# Patient Record
Sex: FEMALE | Race: WHITE | Employment: STUDENT | URBAN - METROPOLITAN AREA
[De-identification: names, ages, dates, MRNs, and addresses within clinical notes are randomized per-mention and may not be internally consistent; named-entity substitution may affect disease eponyms.]

---

## 2020-03-09 ENCOUNTER — HOSPITAL ENCOUNTER (EMERGENCY)
Age: 20
Discharge: HOME | End: 2020-03-09
Payer: COMMERCIAL

## 2020-03-09 VITALS
OXYGEN SATURATION: 99 % | SYSTOLIC BLOOD PRESSURE: 128 MMHG | BODY MASS INDEX: 27.6 KG/M2 | DIASTOLIC BLOOD PRESSURE: 79 MMHG | WEIGHT: 150 LBS | HEIGHT: 62 IN | RESPIRATION RATE: 16 BRPM | TEMPERATURE: 98.5 F | HEART RATE: 77 BPM

## 2020-03-09 PROCEDURE — 99283 EMERGENCY DEPT VISIT LOW MDM: CPT

## 2020-03-09 RX ORDER — ONDANSETRON 4 MG/1
4 TABLET, ORALLY DISINTEGRATING ORAL EVERY 8 HOURS PRN
Qty: 20 TABLET | Refills: 0 | Status: SHIPPED | OUTPATIENT
Start: 2020-03-09 | End: 2021-11-10 | Stop reason: ALTCHOICE

## 2020-03-09 RX ORDER — ONDANSETRON 4 MG/1
4 TABLET, ORALLY DISINTEGRATING ORAL EVERY 8 HOURS PRN
Qty: 20 TABLET | Refills: 0 | Status: SHIPPED | OUTPATIENT
Start: 2020-03-09 | End: 2020-03-09 | Stop reason: SDUPTHER

## 2020-03-09 ASSESSMENT — PAIN SCALES - GENERAL
PAINLEVEL_OUTOF10: 4
PAINLEVEL_OUTOF10: 6

## 2020-03-09 ASSESSMENT — PAIN - FUNCTIONAL ASSESSMENT: PAIN_FUNCTIONAL_ASSESSMENT: 0-10

## 2020-03-09 ASSESSMENT — PAIN DESCRIPTION - LOCATION: LOCATION: HEAD

## 2020-03-09 ASSESSMENT — PAIN DESCRIPTION - DESCRIPTORS: DESCRIPTORS: HEADACHE

## 2020-03-09 ASSESSMENT — PAIN DESCRIPTION - PAIN TYPE: TYPE: ACUTE PAIN

## 2020-03-09 NOTE — ED PROVIDER NOTES
 Cetirizine HCl (ALL DAY ALLERGY) 10 MG CAPS Take  by mouth.  montelukast (SINGULAIR) 5 MG chewable tablet Take 5 mg by mouth nightly.  Melatonin 5 MG TABS tablet Take  by mouth nightly. ALLERGIES    Allergies   Allergen Reactions    Amoxicillin Hives       FAMILY HISTORY    Family History   Problem Relation Age of Onset    Cancer Maternal Uncle     Osteoporosis Maternal Grandfather        SOCIAL HISTORY    Social History     Socioeconomic History    Marital status: Single     Spouse name: None    Number of children: None    Years of education: None    Highest education level: None   Occupational History    None   Social Needs    Financial resource strain: None    Food insecurity     Worry: None     Inability: None    Transportation needs     Medical: None     Non-medical: None   Tobacco Use    Smoking status: Never Smoker    Smokeless tobacco: Never Used   Substance and Sexual Activity    Alcohol use: No    Drug use: No    Sexual activity: Never   Lifestyle    Physical activity     Days per week: None     Minutes per session: None    Stress: None   Relationships    Social connections     Talks on phone: None     Gets together: None     Attends Yarsanism service: None     Active member of club or organization: None     Attends meetings of clubs or organizations: None     Relationship status: None    Intimate partner violence     Fear of current or ex partner: None     Emotionally abused: None     Physically abused: None     Forced sexual activity: None   Other Topics Concern    None   Social History Narrative    None       REVIEW OF SYSTEMS    10 systems reviewed, pertinent positives per HPI otherwise noted to be negative      PHYSICAL EXAM  Vitals:    03/09/20 1453   BP: 128/79   Pulse: 77   Resp: 16   Temp:    SpO2: 99%       GENERAL: Patient is well-developed, well-nourished,  no acute distress. No apparent discomfort. Non toxic appearing. HEENT:  Normocephalic. Atraumatic. There are no bony depressions, instability, step-off, or crepitus to palpation of the skull. There is no raccoon's eyes or battles sign observed. PERRL. EOMI. Conjunctiva appear normal.  External ears are normal.  Bilateral TM are without erythema and are not bulging. There is no evidence of rupture or hemotympanum. There is no facial asymmetry. Tongue is midline, uvula is midline. Posterior oropharynx is without edema, erythema, or exudate. NECK: Supple with normal ROM. Trachea midline. There is no tenderness to palpation of the cervical midline spine. LUNGS:  Normal work of breathing. Speaking comfortably in full sentences. Breath sounds clear throughout all lung fields. CADIOVASCULAR:  Regular rate and rhythm. S1/S2 normal, no murmurs, rubs, or gallops on exam. Peripheral pulses are symmetric and strong. GI: Nondistended. Soft, non-tender to palpation, bowel sounds active in all 4 quadrants, no hepatosplenomegaly. EXTREMITIES: Normal ROM, no edema, no tenderness, or deformity. Distal pulses palpable. No gross deformities or trauma noted. Moving all extremities equally and appropriately. SKIN: Warm/dry. Skin is intact. No rashes/lesions noted. PSYCHIATRIC: Patient is alert and oriented with normal affect  NEUROLOGIC: GCS is 15. Cranial nerves II-XII grossly intact. Moves all extremities with equal strength. No gross sensory deficits. Answers questions/follows commands appropriately. Procedure  None    LABS  No results found for this visit on 03/09/20. RADIOLOGY    No results found.     [place] Criteria for Head CT Imaging  Imaging is indicated if any of the following are present:  GCS < 15 two hours after injury: No  Suspected open/depressed skull fracture: No  Signs of basilar skull fracture: No  Two or more episodes of vomiting since injury: No  Age 72 years or older: No  Amnesia of time before impact: No  Dangerous mechanism (pedestrian struck by motor vehicle, ejected from MVC, fall >3ft or >5 steps): No  Neurologic deficits on exam: No  Seizures after injury: No  Bleeding diathesis or anticoagulant use: No    Based on the [place] HCT rules, head CT imaging is not indicated at this time. ED COURSE/MDM  Patient seen and evaluated. Old records reviewed. I have evaluated this patient. My supervising physician was available for consultation. Sim Serrato presented to the ED today with above noted complaints. The patient is neurologically intact: GCS is 15, CN II-XII intact, no focal or lateralizing neurologic deficits on exam. The patient is without signs of basilar skull fracture. Based on [facility] [address] criteria the patient did not need CT imaging at this time. Patient's fall was 5 days ago and I feel her symptoms are most consistent with concussion. I advised the patient and her parents were present at the bedside that imaging is not recommended. I discussed concussion and precautions about returning to horse riding prior to her symptoms resolving. She was given return to play guidelines. We discussed use of over-the-counter medication to include ibuprofen to help with headache. I also discussed with her rest and decrease stress and stimulation. Patient was discharged with Zofran to see if this will help with the nausea. She was advised to follow-up with primary care provider in regards to today's ED visit. At this point I do not feel the patient requires further work up and it is reasonable to discharge the patient. A discussion was had with the patient regarding diagnosis, treatment/ plan of care, and follow up. All questions were answered. Patient will follow up as directed for further evaluation/treatment. The patient was given strict return precautions as we discussed symptoms that would necessitate return to the ED. Patient will return to ED for new/worsening symptoms.  The patient verbalized their understanding and agreement with the above plan.    Please refer to AVS for further details of the discharge instructions. Patient was sent home with a prescription for below medication/s. I did [place] patient on appropriate use of these medication. Discharge Medication List as of 3/9/2020  2:46 PM          I estimate there is LOW risk for SUBARACHNOID HEMORRHAGE, MENINGITIS, INTRACRANIAL HEMORRHAGE, SUBDURAL HEMATOMA, OR STROKE, thus I consider the discharge disposition reasonable. Ligia Quintanilla and I have discussed the diagnosis and risks, and we agree with discharging home to follow-up with their primary doctor. We also discussed returning to the Emergency Department immediately if new or worsening symptoms occur. We have discussed the symptoms which are most concerning (e.g., changing or worsening pain, weakness, vomiting, fever) that necessitate immediate return. Clinical Impression  1. Injury of head, initial encounter    2. Fall from horse, initial encounter        Discharge Vital Signs:  Blood pressure 128/79, pulse 77, temperature 98.5 °F (36.9 °C), temperature source Oral, resp. rate 16, height 5' 2\" (1.575 m), weight 150 lb (68 kg), SpO2 99 %. FOLLOW UP  Traci Ervin DO  [address] [facility] [address]    Call   As needed    [facility]  ED  [address] [facility] [address]  Go to   If symptoms worsen      DISPOSITION  Patient was discharged to home in good condition. Comment: Please note this report has been produced using speech recognition software and may contain errors related to that system including errors in grammar, punctuation, and spelling, as well as words and phrases that may be inappropriate. If there are any questions or concerns please feel free to contact the dictating provider for clarification.         ZAY Olivas - CNP  03/09/20 2469

## 2021-09-08 ENCOUNTER — HOSPITAL ENCOUNTER (OUTPATIENT)
Age: 21
Discharge: HOME | End: 2021-09-08
Payer: COMMERCIAL

## 2021-09-08 LAB
ANION GAP SERPL CALCULATED.3IONS-SCNC: 12 MMOL/L (ref 3–16)
BILIRUBIN URINE: NEGATIVE
BLOOD, URINE: NEGATIVE
BUN BLDV-MCNC: 6 MG/DL (ref 7–20)
CALCIUM SERPL-MCNC: 9.6 MG/DL (ref 8.3–10.6)
CHLORIDE BLD-SCNC: 104 MMOL/L (ref 99–110)
CLARITY: CLEAR
CO2: 23 MMOL/L (ref 21–32)
COLOR: NORMAL
CREAT SERPL-MCNC: 0.6 MG/DL (ref 0.6–1.1)
GFR AFRICAN AMERICAN: >60
GFR NON-AFRICAN AMERICAN: >60
GLUCOSE BLD-MCNC: 88 MG/DL (ref 70–99)
GLUCOSE URINE: NEGATIVE MG/DL
HCT VFR BLD CALC: 40.1 % (ref 36–48)
HEMOGLOBIN: 13.5 G/DL (ref 12–16)
KETONES, URINE: NEGATIVE MG/DL
LEUKOCYTE ESTERASE, URINE: NEGATIVE
MCH RBC QN AUTO: 28.4 PG (ref 26–34)
MCHC RBC AUTO-ENTMCNC: 33.8 G/DL (ref 31–36)
MCV RBC AUTO: 84.2 FL (ref 80–100)
MICROSCOPIC EXAMINATION: NORMAL
NITRITE, URINE: NEGATIVE
PDW BLD-RTO: 14.1 % (ref 12.4–15.4)
PH UA: 6 (ref 5–8)
PLATELET # BLD: 263 K/UL (ref 135–450)
PMV BLD AUTO: 7.3 FL (ref 5–10.5)
POTASSIUM SERPL-SCNC: 4.4 MMOL/L (ref 3.5–5.1)
PROTEIN UA: NEGATIVE MG/DL
RBC # BLD: 4.76 M/UL (ref 4–5.2)
SODIUM BLD-SCNC: 139 MMOL/L (ref 136–145)
SPECIFIC GRAVITY UA: 1.02 (ref 1–1.03)
URINE TYPE: NORMAL
UROBILINOGEN, URINE: 0.2 E.U./DL
WBC # BLD: 8.8 K/UL (ref 4–11)

## 2021-09-08 PROCEDURE — 85027 COMPLETE CBC AUTOMATED: CPT

## 2021-09-08 PROCEDURE — 81003 URINALYSIS AUTO W/O SCOPE: CPT

## 2021-09-08 PROCEDURE — 36415 COLL VENOUS BLD VENIPUNCTURE: CPT

## 2021-09-08 PROCEDURE — 80048 BASIC METABOLIC PNL TOTAL CA: CPT

## 2021-11-10 ENCOUNTER — OFFICE VISIT (OUTPATIENT)
Dept: ENT CLINIC | Age: 21
End: 2021-11-10
Payer: COMMERCIAL

## 2021-11-10 VITALS
HEART RATE: 90 BPM | SYSTOLIC BLOOD PRESSURE: 121 MMHG | DIASTOLIC BLOOD PRESSURE: 71 MMHG | BODY MASS INDEX: 33.49 KG/M2 | HEIGHT: 62 IN | TEMPERATURE: 97.9 F | WEIGHT: 182 LBS

## 2021-11-10 DIAGNOSIS — J03.91 RECURRENT ACUTE TONSILLITIS: Primary | ICD-10-CM

## 2021-11-10 DIAGNOSIS — G47.30 MILD SLEEP APNEA: ICD-10-CM

## 2021-11-10 DIAGNOSIS — R53.83 OTHER FATIGUE: ICD-10-CM

## 2021-11-10 PROCEDURE — 99204 OFFICE O/P NEW MOD 45 MIN: CPT | Performed by: OTOLARYNGOLOGY

## 2021-11-10 RX ORDER — ACETAMINOPHEN, ASPIRIN AND CAFFEINE 250; 250; 65 MG/1; MG/1; MG/1
1 TABLET, FILM COATED ORAL PRN
COMMUNITY

## 2021-11-10 RX ORDER — SERTRALINE HYDROCHLORIDE 100 MG/1
100 TABLET, FILM COATED ORAL 2 TIMES DAILY
COMMUNITY

## 2021-11-10 RX ORDER — CYCLOBENZAPRINE HCL 10 MG
10 TABLET ORAL PRN
COMMUNITY

## 2021-11-10 RX ORDER — LEVONORGESTREL AND ETHINYL ESTRADIOL 0.1-0.02MG
1 KIT ORAL DAILY
COMMUNITY

## 2021-11-10 ASSESSMENT — ENCOUNTER SYMPTOMS
SINUS PRESSURE: 0
APNEA: 0
SHORTNESS OF BREATH: 0
TROUBLE SWALLOWING: 0
COUGH: 0
FACIAL SWELLING: 0
EYE ITCHING: 0
VOICE CHANGE: 0
SORE THROAT: 0

## 2021-11-10 NOTE — LETTER
[facility]    Surgery Schedule Request Form: 11/10/21  [facility] , Bharathi Wynn      DATE OF SURGERY: ***    TIME OF SURGERY:  ***            CONF #: ____________________       Patient Information:    Patient name: Karl Diaz    YOB: 2000 Age/Sex:21 y.o./female    SS #:[id number]    Wt Readings from Last 1 Encounters:   11/10/21 182 lb (82.6 kg)       Telephone Information:   Mobile [phone]     Home [phone]     Surgeon & Procedure Information:     Lead surgeon: Nevaeh Coleman Co-Surgeon: no  Phone: [phone] Fax: [phone]  PCP: Lizzy Reyez DO    Diagnosis: recurrent acute tonsillitis    Procedure name/CPT: T&A over than 12 [id number]    Procedure length: 1 hour Anesthesia: General    Special Equipment: no    Patient Status: {patient status ent:[id number]}    Primary Payor Plan: ***  Member ID: ***   Subscriber name: ***    [] Implement attached clinical orders for patient.       Electronically signed by Carin Kim DO on 11/10/2021 at 2:47 PM

## 2021-11-10 NOTE — PROGRESS NOTES
[facility], [address], [address], [address] [place] [address]  P: [phone]       Patient     Daphnie Ramirez  2000    ChiefComplaint     Chief Complaint   Patient presents with    Other     Complains of issues sleeping. This has been going on since she was 15. History of Present Illness     Rob Moon is a 59-year-old female here today for evaluation of mild obstructive sleep apnea, daytime fatigue and recurrent acute tonsillitis. She reports for the last several years 3 infections of strep throat per year for at least the last 3 years. Most recent infection was in the spring which she states was so severe she was nearly hospitalized. Also reports daily fatigue that has been ongoing since the age of 13. She has tried multiple interventions including lifestyle modification and other interventions as recommended by PCP. Recently had home sleep study performance demonstrated mild obstructive sleep apnea. She is here today for evaluation. Reports fatigue is severe enough that she has caught herself falling asleep at the wheel and has had to have her mom come pick her up and leave her car as she is so fatigued despite what she believes is a good night sleep.     Past Medical History     Past Medical History:   Diagnosis Date    Asthma     Fractures     Transient synovitis, left ankle and foot        Past Surgical History     Past Surgical History:   Procedure Laterality Date    FOOT SURGERY      KNEE SURGERY Left     KNEE SURGERY Right        Family History     Family History   Problem Relation Age of Onset    Cancer Maternal Uncle     Osteoporosis Maternal Grandfather        Social History     Social History     Tobacco Use    Smoking status: Never Smoker    Smokeless tobacco: Never Used   Substance Use Topics    Alcohol use: No    Drug use: No        Allergies     Allergies   Allergen Reactions    Amoxicillin Hives       Medications     Current Outpatient Medications   Medication Sig Dispense Refill    sertraline (ZOLOFT) 100 MG tablet Take 150 mg by mouth daily      levonorgestrel-ethinyl estradiol (LARISSIA) 0.1-20 MG-MCG per tablet Take 1 tablet by mouth daily      cyclobenzaprine (FLEXERIL) 10 MG tablet Take 10 mg by mouth as needed for Muscle spasms Splits the pill in half. Only takes it when needed.  aspirin-acetaminophen-caffeine (EXCEDRIN MIGRAINE) 250-250-65 MG per tablet Take 1 tablet by mouth as needed for Headaches      acetaminophen (TYLENOL) 500 MG tablet Take 1,000 mg by mouth.  cetirizine (ZYRTEC) 10 MG tablet Take 10 mg by mouth.  montelukast (SINGULAIR) 10 MG tablet       Cetirizine HCl (ALL DAY ALLERGY) 10 MG CAPS Take  by mouth. No current facility-administered medications for this visit. Review of Systems     Review of Systems   Constitutional: Positive for fatigue. Negative for appetite change, chills, fever and unexpected weight change. HENT: Negative for congestion, ear discharge, ear pain, facial swelling, hearing loss, nosebleeds, postnasal drip, sinus pressure, sneezing, sore throat, tinnitus, trouble swallowing and voice change. Eyes: Negative for itching. Respiratory: Negative for apnea, cough and shortness of breath. Endocrine: Negative for cold intolerance and heat intolerance. Musculoskeletal: Negative for myalgias and neck pain. Skin: Negative for rash. Allergic/Immunologic: Negative for environmental allergies. Neurological: Negative for dizziness and headaches. Psychiatric/Behavioral: Negative for confusion, decreased concentration and sleep disturbance. PhysicalExam     Vitals:    11/10/21 1432   BP: 121/71   Site: Right Upper Arm   Position: Sitting   Cuff Size: Medium Adult   Pulse: 90   Temp: 97.9 °F (36.6 °C)   Weight: 182 lb (82.6 kg)   Height: 5' 2\" (1.575 m)       Physical Exam  Constitutional:       General: She is not in acute distress.      Appearance: She is well-developed. HENT:      Head: Normocephalic and atraumatic. Right Ear: Tympanic membrane, ear canal and external ear normal. No drainage. No middle ear effusion. Tympanic membrane is not bulging. Tympanic membrane has normal mobility. Left Ear: Tympanic membrane, ear canal and external ear normal. No drainage. No middle ear effusion. Tympanic membrane is not bulging. Tympanic membrane has normal mobility. Nose: No mucosal edema or rhinorrhea. Mouth/Throat:      Lips: Pink. Mouth: Mucous membranes are moist.      Tongue: No lesions. Palate: No mass. Pharynx: Uvula midline. Tonsils: 3+ on the right. 3+ on the left. Comments: Mallampati 3  Obtuse cervicomental angle   Mirror exam was performed. The nasopharynx, larynx,or hypopharynx were examined. Vocal fold motion was examined. Pertinent findings include: normal  Eyes:      Pupils: Pupils are equal, round, and reactive to light. Neck:      Thyroid: No thyroid mass or thyromegaly. Trachea: Trachea and phonation normal.   Cardiovascular:      Pulses: Normal pulses. Pulmonary:      Effort: Pulmonary effort is normal. No accessory muscle usage or respiratory distress. Breath sounds: No stridor. Musculoskeletal:      Cervical back: Full passive range of motion without pain. Lymphadenopathy:      Head:      Right side of head: No submental or submandibular adenopathy. Left side of head: No submental or submandibular adenopathy. Cervical: No cervical adenopathy. Right cervical: No superficial, deep or posterior cervical adenopathy. Left cervical: No superficial, deep or posterior cervical adenopathy. Skin:     General: Skin is warm and dry. Neurological:      Mental Status: She is alert and oriented to person, place, and time. Cranial Nerves: No cranial nerve deficit.       Coordination: Coordination normal.      Gait: Gait normal.   Psychiatric:         Thought Content: Thought content normal.           Assessment and Plan     1. Recurrent acute tonsillitis  Given the above history and the patient's desire for surgery, they are is a candidate for tonsillectomy with possible adenoidectomy.   -Risks and benefits of the above procedure include pain, dysphagia, inflammation, infection (removal of the tonsils and/or adenoids does not prevent pharyngitis), hemorrhage requiring operative management, dysgeusia/decreased sensation to the ipsilateral tongue. Adenoidectomy carries additional specific risks of velopharyngeal insufficiency with possible hypernasal speech or nasal regurgitation of food/liquids. -General anesthesia carries its own risks, which will be discussed with the Anesthesiology team on day of surgery  -After discussion of the risks and benefits, the patient was in agreement with the above plan for tonsillectomy/adenoidectomy.    -The patient will need to see their primary care physician for medical clearance    2. Mild sleep apnea  -Discussed patient's anatomic findings including large tonsils, obtuse cervical mental angle and Mallampati 3. Discussed options of oral appliance versus tonsillectomy versus CPAP  -Discussed that her severity of fatigue seems out of proportion to her diagnosis of mild sleep apnea  -She is interested in evaluation by sleep physician for other possible sleep disorders that may be causing her symptoms  - Brittani - Desiree Nagel MD, Sleep Medicine, [facility]    3. Other fatigue  - Yessica Pritchard MD, Sleep Medicine, [facility]      Return 3 weeks after tonsillectomy    Mary Christianson DO  11/10/21      Portions of this note were dictated using Dragon.  There may be linguistic errors secondary to the use of this program.

## 2021-11-10 NOTE — LETTER
[facility]  [address]  Phone: [phone]  Fax: [phone]        November 10, 2021     Patient: Kimberley Gray   YOB: 2000   Date of Visit: 11/10/2021       To Whom it May Concern:    Gibran Roy was seen in my clinic on 11/10/2021. If you have any questions or concerns, please don't hesitate to call.     Sincerely,         Douglas Fields, DO

## 2021-11-29 ENCOUNTER — TELEPHONE (OUTPATIENT)
Dept: ENT CLINIC | Age: 21
End: 2021-11-29

## 2021-12-23 NOTE — PROGRESS NOTES
Anyina Haymaker    Age 24 y.o.    female    2000    MRN [id number]    1/3/2022  Arrival Time_____________  OR Time____________36 [address]ta Galas     Procedure(s):  TONSILLECTOMY AND ADENOIDECTOMY                      General    Surgeon(s):  Luz Beagle, DO       Phone [phone] ([place])     [address]  Cell         Work  _____________________________________________________________________  _____________________________________________________________________  _____________________________________________________________________  _____________________________________________________________________  _____________________________________________________________________    PCP _____________________________ Phone_________________     H&P__________________Bringing      Chart            Epic   DOS      Called________  EKG__________________Bringing      Chart            Epic   DOS      Called________  LAB__________________ Bringing      Chart            Epic   DOS      Called________  Cardiac Clearance_______Bringing      Chart            Epic      DOS      Called________    Cardiologist________________________ Phone___________________________    ? Congregation concerns / Waiver on Chart            PAT Communications________________  ? Pre-op Instructions Given [place]          _________________________________  ? Directions to Surgery Center                          _________________________________  ? Transportation Home_______________      __________________________________  ?  Crutches/Walker__________________        __________________________________    ________Pre-op Orders   _______Transcribed    _______Wt.  ________Pharmacy          _______SCD  ______VTE     ______TED Anil Gins  _______  Surgery Consent    _______  Anesthesia Consent         COVID DATE______________LOCATION________________ RESULT__________

## 2021-12-27 NOTE — PROGRESS NOTES
Obstructive Sleep Apnea (MEGAN) Screening     Patient:  Yonis Plata    YOB: 2000      Medical Record #:  [id number]                     Date:  12/27/2021     1. Are you a loud and/or regular snorer? [x]  Yes       [] No    2. Have you been observed to gasp or stop breathing during sleep? []  Yes       [x] No    3. Do you feel tired or groggy upon awakening or do you awaken with a headache? [x]  Yes       [] No    4. Are you often tired or fatigued during the wake time hours? [x]  Yes       [] No    5. Do you fall asleep sitting, reading, watching TV or driving? [x]  Yes       [] No    6. Do you often have problems with memory or concentration? [x]  Yes       [] No    **If patient's score is ? 3 they are considered high risk for MEGAN. An Anesthesia provider will evaluate the patient and develop a plan of care the day of surgery. Note:  If the patient's BMI is more than 35 kg m¯² , has neck circumference > 40 cm, and/or high blood pressure the risk is greater (© American Sleep Apnea Association, 2006).

## 2021-12-28 ENCOUNTER — TELEPHONE (OUTPATIENT)
Dept: ENT CLINIC | Age: 21
End: 2021-12-28

## 2021-12-30 ENCOUNTER — HOSPITAL ENCOUNTER (OUTPATIENT)
Age: 21
Discharge: HOME | End: 2021-12-30
Payer: COMMERCIAL

## 2021-12-30 PROCEDURE — U0005 INFEC AGEN DETEC AMPLI PROBE: HCPCS

## 2021-12-30 PROCEDURE — U0003 INFECTIOUS AGENT DETECTION BY NUCLEIC ACID (DNA OR RNA); SEVERE ACUTE RESPIRATORY SYNDROME CORONAVIRUS 2 (SARS-COV-2) (CORONAVIRUS DISEASE [COVID-19]), AMPLIFIED PROBE TECHNIQUE, MAKING USE OF HIGH THROUGHPUT TECHNOLOGIES AS DESCRIBED BY CMS-2020-01-R: HCPCS

## 2021-12-30 NOTE — PROGRESS NOTES
Bong Galmyron    Age 24 y.o.    female    2000    MRN [id number]    1/3/2022  Arrival Time_____________  OR Time____________90 Josefine Parmjit     Procedure(s):  TONSILLECTOMY AND ADENOIDECTOMY -MEGAN=5                      General     Surgeon(s):  Oscar Eaves, DO      DAY ADMIT ___  SDS/OP ___  OUTPT IN BED ___         Phone [phone] (home)    PCP _____________________ Phone_________________ Epic ( ) Epic CE ( ) Appt ________    ADDITIONAL INFO __________________________________ Cardio/Consult _____________    NOTES _____________________________________________________________________    ____________________________________________________________________________    PAT APPT DATE:________ TIME: ________  FAXED QAD: _______  (__) H&P w/ hospitalist  ____________________________________________________________________________    COVID TEST: Date/Location______________        NURSING HISTORY COMPLETE: _______  (__) CBC       (__) W/ DIFF ___________  (__)  ECHO    __________  (__) Hgb A1C    ___________  (__) CHEST X RAY   __________  (__) LIPID PROFILE  ___________  (__) EKG   __________  (__) PT/PTT   ___________  (__) PFT's   __________  (__) BMP   ___________  (__) CAROTIDS  __________  (__) CMP   ___________  (__) VEIN MAPPING  __________  (__) U/A   ___________  (__) HISTORY & PHYSICAL __________  (__) URINE C & S  ___________  (__) CARDIAC CLEARANCE __________  (__) U/A W/ FLEX  ___________  (__) PULM.  CLEARANCE __________  (__) SERUM PREGNANCY ___________  (__) Check Epic DOS orders __________  (__) TYPE & SCREEN ________ repeat ( ) (__)  __________________ __________  (__) ALBUMIN   ___________  (__)  __________________ __________  (__) TRANSFERRIN  ___________  (__)  __________________ __________  (__) LIVER PROFILE  ___________  (__)  __________________ __________  (__) CARBOXY HGB  ___________  (__) URINE PREG DOS __________  (__) NICOTINE & MET.  ___________  (__) BLOOD SUGAR DOS __________  (__) PREALBUMIN  ___________    (__) MRSA NASAL SWAB ___________  (__) BLOOD THINNERS __________  (__) ACE/ ARBS: _____________________    (__) BETABLOCKERS ___________________

## 2021-12-31 LAB — SARS-COV-2: NOT DETECTED

## 2022-01-02 ENCOUNTER — ANESTHESIA EVENT (OUTPATIENT)
Dept: OPERATING ROOM | Age: 22
End: 2022-01-02
Payer: COMMERCIAL

## 2022-01-02 ENCOUNTER — PREP FOR PROCEDURE (OUTPATIENT)
Dept: ENT CLINIC | Age: 22
End: 2022-01-02

## 2022-01-02 RX ORDER — SODIUM CHLORIDE 9 MG/ML
25 INJECTION, SOLUTION INTRAVENOUS PRN
Status: CANCELLED | OUTPATIENT
Start: 2022-01-02

## 2022-01-02 RX ORDER — SODIUM CHLORIDE 0.9 % (FLUSH) 0.9 %
10 SYRINGE (ML) INJECTION PRN
Status: CANCELLED | OUTPATIENT
Start: 2022-01-02

## 2022-01-02 RX ORDER — SODIUM CHLORIDE 0.9 % (FLUSH) 0.9 %
10 SYRINGE (ML) INJECTION EVERY 12 HOURS SCHEDULED
Status: CANCELLED | OUTPATIENT
Start: 2022-01-02

## 2022-01-03 ENCOUNTER — HOSPITAL ENCOUNTER (OUTPATIENT)
Age: 22
Setting detail: OUTPATIENT SURGERY
Discharge: HOME | End: 2022-01-03
Attending: OTOLARYNGOLOGY | Admitting: OTOLARYNGOLOGY
Payer: COMMERCIAL

## 2022-01-03 ENCOUNTER — ANESTHESIA (OUTPATIENT)
Dept: OPERATING ROOM | Age: 22
End: 2022-01-03
Payer: COMMERCIAL

## 2022-01-03 VITALS
WEIGHT: 188 LBS | RESPIRATION RATE: 18 BRPM | SYSTOLIC BLOOD PRESSURE: 111 MMHG | HEIGHT: 63 IN | OXYGEN SATURATION: 96 % | HEART RATE: 91 BPM | DIASTOLIC BLOOD PRESSURE: 68 MMHG | BODY MASS INDEX: 33.31 KG/M2 | TEMPERATURE: 96.2 F

## 2022-01-03 VITALS
RESPIRATION RATE: 2 BRPM | OXYGEN SATURATION: 99 % | DIASTOLIC BLOOD PRESSURE: 56 MMHG | SYSTOLIC BLOOD PRESSURE: 111 MMHG

## 2022-01-03 DIAGNOSIS — J03.91 RECURRENT ACUTE TONSILLITIS: Primary | ICD-10-CM

## 2022-01-03 DIAGNOSIS — Z90.89 S/P TONSILLECTOMY: ICD-10-CM

## 2022-01-03 LAB — PREGNANCY, URINE: NEGATIVE

## 2022-01-03 PROCEDURE — 7100000010 HC PHASE II RECOVERY - FIRST 15 MIN: Performed by: OTOLARYNGOLOGY

## 2022-01-03 PROCEDURE — 3700000001 HC ADD 15 MINUTES (ANESTHESIA): Performed by: OTOLARYNGOLOGY

## 2022-01-03 PROCEDURE — 88304 TISSUE EXAM BY PATHOLOGIST: CPT

## 2022-01-03 PROCEDURE — 6360000002 HC RX W HCPCS: Performed by: NURSE ANESTHETIST, CERTIFIED REGISTERED

## 2022-01-03 PROCEDURE — 2500000003 HC RX 250 WO HCPCS: Performed by: NURSE ANESTHETIST, CERTIFIED REGISTERED

## 2022-01-03 PROCEDURE — 6360000002 HC RX W HCPCS: Performed by: ANESTHESIOLOGY

## 2022-01-03 PROCEDURE — 3600000013 HC SURGERY LEVEL 3 ADDTL 15MIN: Performed by: OTOLARYNGOLOGY

## 2022-01-03 PROCEDURE — 3600000003 HC SURGERY LEVEL 3 BASE: Performed by: OTOLARYNGOLOGY

## 2022-01-03 PROCEDURE — 7100000011 HC PHASE II RECOVERY - ADDTL 15 MIN: Performed by: OTOLARYNGOLOGY

## 2022-01-03 PROCEDURE — 2500000003 HC RX 250 WO HCPCS: Performed by: OTOLARYNGOLOGY

## 2022-01-03 PROCEDURE — 2580000003 HC RX 258: Performed by: OTOLARYNGOLOGY

## 2022-01-03 PROCEDURE — 7100000000 HC PACU RECOVERY - FIRST 15 MIN: Performed by: OTOLARYNGOLOGY

## 2022-01-03 PROCEDURE — 7100000001 HC PACU RECOVERY - ADDTL 15 MIN: Performed by: OTOLARYNGOLOGY

## 2022-01-03 PROCEDURE — 6370000000 HC RX 637 (ALT 250 FOR IP): Performed by: ANESTHESIOLOGY

## 2022-01-03 PROCEDURE — 2580000003 HC RX 258: Performed by: ANESTHESIOLOGY

## 2022-01-03 PROCEDURE — 2709999900 HC NON-CHARGEABLE SUPPLY: Performed by: OTOLARYNGOLOGY

## 2022-01-03 PROCEDURE — 42826 REMOVAL OF TONSILS: CPT | Performed by: OTOLARYNGOLOGY

## 2022-01-03 PROCEDURE — 84703 CHORIONIC GONADOTROPIN ASSAY: CPT

## 2022-01-03 PROCEDURE — 3700000000 HC ANESTHESIA ATTENDED CARE: Performed by: OTOLARYNGOLOGY

## 2022-01-03 RX ORDER — SODIUM CHLORIDE 9 MG/ML
25 INJECTION, SOLUTION INTRAVENOUS PRN
Status: DISCONTINUED | OUTPATIENT
Start: 2022-01-03 | End: 2022-01-03 | Stop reason: HOSPADM

## 2022-01-03 RX ORDER — MEPERIDINE HYDROCHLORIDE 50 MG/ML
12.5 INJECTION INTRAMUSCULAR; INTRAVENOUS; SUBCUTANEOUS EVERY 5 MIN PRN
Status: DISCONTINUED | OUTPATIENT
Start: 2022-01-03 | End: 2022-01-03 | Stop reason: HOSPADM

## 2022-01-03 RX ORDER — MAGNESIUM HYDROXIDE 1200 MG/15ML
LIQUID ORAL CONTINUOUS PRN
Status: COMPLETED | OUTPATIENT
Start: 2022-01-03 | End: 2022-01-03

## 2022-01-03 RX ORDER — DEXAMETHASONE SODIUM PHOSPHATE 4 MG/ML
INJECTION, SOLUTION INTRA-ARTICULAR; INTRALESIONAL; INTRAMUSCULAR; INTRAVENOUS; SOFT TISSUE PRN
Status: DISCONTINUED | OUTPATIENT
Start: 2022-01-03 | End: 2022-01-03 | Stop reason: SDUPTHER

## 2022-01-03 RX ORDER — HYDROCODONE BITARTRATE AND ACETAMINOPHEN 5; 325 MG/1; MG/1
1 TABLET ORAL EVERY 4 HOURS PRN
Qty: 38 TABLET | Refills: 0 | Status: SHIPPED | OUTPATIENT
Start: 2022-01-03 | End: 2022-01-10 | Stop reason: SDUPTHER

## 2022-01-03 RX ORDER — ROCURONIUM BROMIDE 10 MG/ML
INJECTION, SOLUTION INTRAVENOUS PRN
Status: DISCONTINUED | OUTPATIENT
Start: 2022-01-03 | End: 2022-01-03 | Stop reason: SDUPTHER

## 2022-01-03 RX ORDER — SODIUM CHLORIDE, SODIUM LACTATE, POTASSIUM CHLORIDE, CALCIUM CHLORIDE 600; 310; 30; 20 MG/100ML; MG/100ML; MG/100ML; MG/100ML
INJECTION, SOLUTION INTRAVENOUS CONTINUOUS
Status: DISCONTINUED | OUTPATIENT
Start: 2022-01-03 | End: 2022-01-03 | Stop reason: HOSPADM

## 2022-01-03 RX ORDER — PROPOFOL 10 MG/ML
INJECTION, EMULSION INTRAVENOUS PRN
Status: DISCONTINUED | OUTPATIENT
Start: 2022-01-03 | End: 2022-01-03 | Stop reason: SDUPTHER

## 2022-01-03 RX ORDER — SODIUM CHLORIDE 0.9 % (FLUSH) 0.9 %
5-40 SYRINGE (ML) INJECTION EVERY 12 HOURS SCHEDULED
Status: DISCONTINUED | OUTPATIENT
Start: 2022-01-03 | End: 2022-01-03 | Stop reason: HOSPADM

## 2022-01-03 RX ORDER — LIDOCAINE HYDROCHLORIDE 10 MG/ML
0.3 INJECTION, SOLUTION EPIDURAL; INFILTRATION; INTRACAUDAL; PERINEURAL
Status: DISCONTINUED | OUTPATIENT
Start: 2022-01-03 | End: 2022-01-03 | Stop reason: HOSPADM

## 2022-01-03 RX ORDER — BUPIVACAINE HYDROCHLORIDE 5 MG/ML
INJECTION, SOLUTION EPIDURAL; INTRACAUDAL PRN
Status: DISCONTINUED | OUTPATIENT
Start: 2022-01-03 | End: 2022-01-03 | Stop reason: ALTCHOICE

## 2022-01-03 RX ORDER — SODIUM CHLORIDE 0.9 % (FLUSH) 0.9 %
5-40 SYRINGE (ML) INJECTION PRN
Status: DISCONTINUED | OUTPATIENT
Start: 2022-01-03 | End: 2022-01-03 | Stop reason: HOSPADM

## 2022-01-03 RX ORDER — OXYCODONE HYDROCHLORIDE AND ACETAMINOPHEN 5; 325 MG/1; MG/1
2 TABLET ORAL PRN
Status: COMPLETED | OUTPATIENT
Start: 2022-01-03 | End: 2022-01-03

## 2022-01-03 RX ORDER — LIDOCAINE HYDROCHLORIDE 20 MG/ML
INJECTION, SOLUTION INFILTRATION; PERINEURAL PRN
Status: DISCONTINUED | OUTPATIENT
Start: 2022-01-03 | End: 2022-01-03 | Stop reason: SDUPTHER

## 2022-01-03 RX ORDER — LABETALOL HYDROCHLORIDE 5 MG/ML
5 INJECTION, SOLUTION INTRAVENOUS EVERY 10 MIN PRN
Status: DISCONTINUED | OUTPATIENT
Start: 2022-01-03 | End: 2022-01-03 | Stop reason: HOSPADM

## 2022-01-03 RX ORDER — ONDANSETRON 2 MG/ML
4 INJECTION INTRAMUSCULAR; INTRAVENOUS EVERY 10 MIN PRN
Status: DISCONTINUED | OUTPATIENT
Start: 2022-01-03 | End: 2022-01-03 | Stop reason: HOSPADM

## 2022-01-03 RX ORDER — SODIUM CHLORIDE 0.9 % (FLUSH) 0.9 %
10 SYRINGE (ML) INJECTION EVERY 12 HOURS SCHEDULED
Status: DISCONTINUED | OUTPATIENT
Start: 2022-01-03 | End: 2022-01-03 | Stop reason: HOSPADM

## 2022-01-03 RX ORDER — HYDRALAZINE HYDROCHLORIDE 20 MG/ML
5 INJECTION INTRAMUSCULAR; INTRAVENOUS EVERY 10 MIN PRN
Status: DISCONTINUED | OUTPATIENT
Start: 2022-01-03 | End: 2022-01-03 | Stop reason: HOSPADM

## 2022-01-03 RX ORDER — OXYCODONE HYDROCHLORIDE AND ACETAMINOPHEN 5; 325 MG/1; MG/1
1 TABLET ORAL PRN
Status: COMPLETED | OUTPATIENT
Start: 2022-01-03 | End: 2022-01-03

## 2022-01-03 RX ORDER — SODIUM CHLORIDE 0.9 % (FLUSH) 0.9 %
10 SYRINGE (ML) INJECTION PRN
Status: DISCONTINUED | OUTPATIENT
Start: 2022-01-03 | End: 2022-01-03 | Stop reason: HOSPADM

## 2022-01-03 RX ORDER — ALBUTEROL SULFATE 90 UG/1
2 AEROSOL, METERED RESPIRATORY (INHALATION) EVERY 6 HOURS PRN
COMMUNITY

## 2022-01-03 RX ORDER — FENTANYL CITRATE 50 UG/ML
INJECTION, SOLUTION INTRAMUSCULAR; INTRAVENOUS PRN
Status: DISCONTINUED | OUTPATIENT
Start: 2022-01-03 | End: 2022-01-03 | Stop reason: SDUPTHER

## 2022-01-03 RX ORDER — ONDANSETRON 2 MG/ML
INJECTION INTRAMUSCULAR; INTRAVENOUS PRN
Status: DISCONTINUED | OUTPATIENT
Start: 2022-01-03 | End: 2022-01-03 | Stop reason: SDUPTHER

## 2022-01-03 RX ORDER — MIDAZOLAM HYDROCHLORIDE 1 MG/ML
INJECTION INTRAMUSCULAR; INTRAVENOUS PRN
Status: DISCONTINUED | OUTPATIENT
Start: 2022-01-03 | End: 2022-01-03 | Stop reason: SDUPTHER

## 2022-01-03 RX ADMIN — LIDOCAINE HYDROCHLORIDE 80 MG: 20 INJECTION, SOLUTION INFILTRATION; PERINEURAL at 07:32

## 2022-01-03 RX ADMIN — MEPERIDINE HYDROCHLORIDE 12.5 MG: 50 INJECTION, SOLUTION INTRAMUSCULAR; INTRAVENOUS; SUBCUTANEOUS at 08:25

## 2022-01-03 RX ADMIN — ROCURONIUM BROMIDE 50 MG: 10 SOLUTION INTRAVENOUS at 07:33

## 2022-01-03 RX ADMIN — PROPOFOL 25 MG: 10 INJECTION, EMULSION INTRAVENOUS at 07:56

## 2022-01-03 RX ADMIN — FENTANYL CITRATE 50 MCG: 50 INJECTION INTRAMUSCULAR; INTRAVENOUS at 07:41

## 2022-01-03 RX ADMIN — FENTANYL CITRATE 50 MCG: 50 INJECTION INTRAMUSCULAR; INTRAVENOUS at 07:47

## 2022-01-03 RX ADMIN — PROPOFOL 25 MG: 10 INJECTION, EMULSION INTRAVENOUS at 08:00

## 2022-01-03 RX ADMIN — SODIUM CHLORIDE, SODIUM LACTATE, POTASSIUM CHLORIDE, AND CALCIUM CHLORIDE: .6; .31; .03; .02 INJECTION, SOLUTION INTRAVENOUS at 07:29

## 2022-01-03 RX ADMIN — PROPOFOL 200 MG: 10 INJECTION, EMULSION INTRAVENOUS at 07:33

## 2022-01-03 RX ADMIN — FENTANYL CITRATE 50 MCG: 50 INJECTION INTRAMUSCULAR; INTRAVENOUS at 07:32

## 2022-01-03 RX ADMIN — OXYCODONE AND ACETAMINOPHEN 2 TABLET: 5; 325 TABLET ORAL at 09:10

## 2022-01-03 RX ADMIN — ONDANSETRON 4 MG: 2 INJECTION INTRAMUSCULAR; INTRAVENOUS at 07:39

## 2022-01-03 RX ADMIN — DEXAMETHASONE SODIUM PHOSPHATE 8 MG: 4 INJECTION, SOLUTION INTRAMUSCULAR; INTRAVENOUS at 07:39

## 2022-01-03 RX ADMIN — SUGAMMADEX 200 MG: 100 INJECTION, SOLUTION INTRAVENOUS at 08:00

## 2022-01-03 RX ADMIN — MIDAZOLAM HYDROCHLORIDE 2 MG: 2 INJECTION, SOLUTION INTRAMUSCULAR; INTRAVENOUS at 07:28

## 2022-01-03 RX ADMIN — HYDROMORPHONE HYDROCHLORIDE 0.25 MG: 1 INJECTION, SOLUTION INTRAMUSCULAR; INTRAVENOUS; SUBCUTANEOUS at 08:34

## 2022-01-03 ASSESSMENT — PULMONARY FUNCTION TESTS
PIF_VALUE: 5
PIF_VALUE: 19
PIF_VALUE: 1
PIF_VALUE: 19
PIF_VALUE: 15
PIF_VALUE: 18
PIF_VALUE: 20
PIF_VALUE: 19
PIF_VALUE: 19
PIF_VALUE: 2
PIF_VALUE: 19
PIF_VALUE: 15
PIF_VALUE: 1
PIF_VALUE: 19
PIF_VALUE: 1
PIF_VALUE: 20
PIF_VALUE: 19
PIF_VALUE: 15
PIF_VALUE: 16
PIF_VALUE: 20
PIF_VALUE: 19
PIF_VALUE: 19
PIF_VALUE: 2
PIF_VALUE: 19
PIF_VALUE: 19
PIF_VALUE: 2
PIF_VALUE: 0
PIF_VALUE: 1
PIF_VALUE: 3
PIF_VALUE: 5
PIF_VALUE: 19
PIF_VALUE: 12
PIF_VALUE: 3

## 2022-01-03 ASSESSMENT — PAIN DESCRIPTION - LOCATION: LOCATION: THROAT

## 2022-01-03 ASSESSMENT — PAIN SCALES - GENERAL
PAINLEVEL_OUTOF10: 2
PAINLEVEL_OUTOF10: 8
PAINLEVEL_OUTOF10: 6
PAINLEVEL_OUTOF10: 4

## 2022-01-03 ASSESSMENT — PAIN DESCRIPTION - PAIN TYPE: TYPE: SURGICAL PAIN

## 2022-01-03 ASSESSMENT — PAIN DESCRIPTION - DESCRIPTORS: DESCRIPTORS: SHARP

## 2022-01-03 ASSESSMENT — PAIN - FUNCTIONAL ASSESSMENT: PAIN_FUNCTIONAL_ASSESSMENT: 0-10

## 2022-01-03 NOTE — BRIEF OP NOTE
Brief Postoperative Note      Patient: Laurence Arellano  YOB: 2000  MRN: [id number]    Date of Procedure: 1/3/2022    Pre-Op Diagnosis: Recurrent acute tonsillitis [J03.91]    Post-Op Diagnosis: Same       Procedure(s):  TONSILLECTOMY AND ADENOIDECTOMY -MEGAN=5    Surgeon(s):  Damian Miller DO    Assistant:  Surgical Assistant: Royal Gao    Anesthesia: General    Estimated Blood Loss (mL): Minimal    Complications: None    Specimens:   ID Type Source Tests Collected by Time Destination   A : tonsils, right tonsil marked with suture Specimen Mouth SURGICAL PATHOLOGY Damian Miller DO 1/3/2022 0745        Implants:  * No implants in log *      Drains: * No LDAs found *    Findings: 3+tonsils    Electronically signed by Damian Miller DO on 1/3/2022 at 7:57 AM

## 2022-01-03 NOTE — OP NOTE
[address] [place] [address]  OPERATIVE REPORT    Patient Name: Taylor Jimenez  YOB: 2000  Medical Record Number:  [id number]  Billing Number:  [id number]  Date of Procedure: 01/03/22  Time: 0730      Pre Operative Diagnoses: 1) recurrent acute tonsillitis   Post Operative Diagnoses: Same as above. Procedure: 1) Tonsillectomy (82115)  Surgeon: Todd Harrell DO  Assistant: none  Anesthesia: General anesthesia. Findings: 1) Chronically infected tonsils with tonsiliths   Indications: The patient is a 24year old female  with a history of recurrent acute tonsillitis. Description: After verification of informed consent, the patient was brought to the operating room and placed in the supine position. General endotracheal anesthesia was induced. The bed was then turned, a shoulder roll placed, and a Kaia-Robert mouth guard inserted and suspended from the [address] [place] [address] Road stand. An suction catheter was placed through the naris and the palate retracted with palpation showing no evidence of submucous cleft. An Allis clamp was then used with Bovie electrocautery on 20 kearney spray to grasp and resect the right tonsil from the superior down to the inferior pole. The left tonsil was grasped at the superior pole and Bovie electrocautery used to resect this from superior to inferior. The stomach was then suctioned, tonsillar fossae re-evaluated and spot cautery employed at 35 kearney spray as indicated, and the patient turned back to the care of Anesthesia to recover. The patient tolerated the procedure well and was transferred to the recovery room in stable condition. I was present through the essential portions of the procedure and involved in all medical decision-making. Specimens: bilateral tonsils   Estimated Blood Loss: 5mL  Complications: none  I attest that I was present for and did the entire procedure myself.   Electronically signed by Mac Corrales DO on 1/3/2022 at 7:58 AM

## 2022-01-03 NOTE — PROGRESS NOTES
Report given to OLVIN Dhaliwal. Pt awake, VSS, No nausea. Pain controlled with medication. Pt not on oxygen. Will continue to monitor.

## 2022-01-03 NOTE — H&P
Juan Snow 03, [address], [address] [place] [address]  P: [phone]        Patient      Caesar Carson  2000     ChiefComplaint           Chief Complaint   Patient presents with    Other       Complains of issues sleeping. This has been going on since she was 15.          History of Present Illness      Juan Costa is a 55-year-old female here today for evaluation of mild obstructive sleep apnea, daytime fatigue and recurrent acute tonsillitis. She reports for the last several years 3 infections of strep throat per year for at least the last 3 years. Most recent infection was in the spring which she states was so severe she was nearly hospitalized.     Also reports daily fatigue that has been ongoing since the age of 13. She has tried multiple interventions including lifestyle modification and other interventions as recommended by PCP. Recently had home sleep study performance demonstrated mild obstructive sleep apnea. She is here today for evaluation.   Reports fatigue is severe enough that she has caught herself falling asleep at the wheel and has had to have her mom come pick her up and leave her car as she is so fatigued despite what she believes is a good night sleep.     Past Medical History      Past Medical History        Past Medical History:   Diagnosis Date    Asthma      Fractures      Transient synovitis, left ankle and foot              Past Surgical History      Past Surgical History         Past Surgical History:   Procedure Laterality Date    FOOT SURGERY        KNEE SURGERY Left      KNEE SURGERY Right              Family History      Family History         Family History   Problem Relation Age of Onset    Cancer Maternal Uncle      Osteoporosis Maternal Grandfather              Social History      Social History           Tobacco Use    Smoking status: Never Smoker    Smokeless tobacco: Never Used   Substance Use Topics    Alcohol use: No    Drug use: No         Allergies           Allergies   Allergen Reactions    Amoxicillin Hives         Medications      Current Facility-Administered Medications          Current Outpatient Medications   Medication Sig Dispense Refill    sertraline (ZOLOFT) 100 MG tablet Take 150 mg by mouth daily        levonorgestrel-ethinyl estradiol (LARISSIA) 0.1-20 MG-MCG per tablet Take 1 tablet by mouth daily        cyclobenzaprine (FLEXERIL) 10 MG tablet Take 10 mg by mouth as needed for Muscle spasms Splits the pill in half. Only takes it when needed.        aspirin-acetaminophen-caffeine (EXCEDRIN MIGRAINE) 250-250-65 MG per tablet Take 1 tablet by mouth as needed for Headaches        acetaminophen (TYLENOL) 500 MG tablet Take 1,000 mg by mouth.        cetirizine (ZYRTEC) 10 MG tablet Take 10 mg by mouth.        montelukast (SINGULAIR) 10 MG tablet          Cetirizine HCl (ALL DAY ALLERGY) 10 MG CAPS Take  by mouth.          No current facility-administered medications for this visit.            Review of Systems      Review of Systems   Constitutional: Positive for fatigue. Negative for appetite change, chills, fever and unexpected weight change. HENT: Negative for congestion, ear discharge, ear pain, facial swelling, hearing loss, nosebleeds, postnasal drip, sinus pressure, sneezing, sore throat, tinnitus, trouble swallowing and voice change. Eyes: Negative for itching. Respiratory: Negative for apnea, cough and shortness of breath. Endocrine: Negative for cold intolerance and heat intolerance. Musculoskeletal: Negative for myalgias and neck pain. Skin: Negative for rash. Allergic/Immunologic: Negative for environmental allergies. Neurological: Negative for dizziness and headaches.    Psychiatric/Behavioral: Negative for confusion, decreased concentration and sleep disturbance.            PhysicalExam      Vitals       Vitals:     11/10/21 1432   BP: 121/71   Site: Right Upper Arm   Position: Sitting   Cuff Size: Medium Adult   Pulse: 90   Temp: 97.9 °F (36.6 °C)   Weight: 182 lb (82.6 kg)   Height: 5' 2\" (1.575 m)            Physical Exam  Constitutional:       General: She is not in acute distress. Appearance: She is well-developed. HENT:      Head: Normocephalic and atraumatic. Right Ear: Tympanic membrane, ear canal and external ear normal. No drainage. No middle ear effusion. Tympanic membrane is not bulging. Tympanic membrane has normal mobility. Left Ear: Tympanic membrane, ear canal and external ear normal. No drainage. No middle ear effusion. Tympanic membrane is not bulging. Tympanic membrane has normal mobility. Nose: No mucosal edema or rhinorrhea. Mouth/Throat:      Lips: Pink. Mouth: Mucous membranes are moist.      Tongue: No lesions. Palate: No mass. Pharynx: Uvula midline. Tonsils: 3+ on the right. 3+ on the left. Comments: Mallampati 3  Obtuse cervicomental angle   Mirror exam was performed. The nasopharynx, larynx,or hypopharynx were examined. Vocal fold motion was examined. Pertinent findings include: normal  Eyes:      Pupils: Pupils are equal, round, and reactive to light. Neck:      Thyroid: No thyroid mass or thyromegaly. Trachea: Trachea and phonation normal.   Cardiovascular:      Pulses: Normal pulses. Pulmonary:      Effort: Pulmonary effort is normal. No accessory muscle usage or respiratory distress. Breath sounds: No stridor. Musculoskeletal:      Cervical back: Full passive range of motion without pain. Lymphadenopathy:      Head:      Right side of head: No submental or submandibular adenopathy. Left side of head: No submental or submandibular adenopathy. Cervical: No cervical adenopathy. Right cervical: No superficial, deep or posterior cervical adenopathy. Left cervical: No superficial, deep or posterior cervical adenopathy. Skin:     General: Skin is warm and dry. Neurological:      Mental Status: She is alert and oriented to person, place, and time. Cranial Nerves: No cranial nerve deficit. Coordination: Coordination normal.      Gait: Gait normal.   Psychiatric:         Thought Content: Thought content normal.               Assessment and Plan      1. Recurrent acute tonsillitis  Given the above history and the patient's desire for surgery, they are is a candidate for tonsillectomy with possible adenoidectomy.   -Risks and benefits of the above procedure include pain, dysphagia, inflammation, infection (removal of the tonsils and/or adenoids does not prevent pharyngitis), hemorrhage requiring operative management, dysgeusia/decreased sensation to the ipsilateral tongue. Adenoidectomy carries additional specific risks of velopharyngeal insufficiency with possible hypernasal speech or nasal regurgitation of food/liquids. -General anesthesia carries its own risks, which will be discussed with the Anesthesiology team on day of surgery  -After discussion of the risks and benefits, the patient was in agreement with the above plan for tonsillectomy/adenoidectomy.    -The patient will need to see their primary care physician for medical clearance     2. Mild sleep apnea  -Discussed patient's anatomic findings including large tonsils, obtuse cervical mental angle and Mallampati 3.   Discussed options of oral appliance versus tonsillectomy versus CPAP  -Discussed that her severity of fatigue seems out of proportion to her diagnosis of mild sleep apnea  -She is interested in evaluation by sleep physician for other possible sleep disorders that may be causing her symptoms  - Mckinley Cabrera MD, Sleep Medicine, [facility]     3. Other fatigue  - Mckinley Cabrera MD, Sleep Medicine, [facility]        Return 3 weeks after tonsillectomy     Christina Allan DO  11/10/21

## 2022-01-03 NOTE — ANESTHESIA PRE PROCEDURE
Department of Anesthesiology  Preprocedure Note       Name:  Omer Martinez   Age:  24 y.o.  :  2000                                          MRN:  [id number]         Date:  2022      Surgeon: Delia Ochoa):  Kaitlynn Flanagan DO    Procedure: Procedure(s):  TONSILLECTOMY AND ADENOIDECTOMY -MEGAN=5    Medications prior to admission:   Prior to Admission medications    Medication Sig Start Date End Date Taking? Authorizing Provider   sertraline (ZOLOFT) 100 MG tablet Take 150 mg by mouth daily    Historical Provider, MD   levonorgestrel-ethinyl estradiol (LARISSIA) 0.1-20 MG-MCG per tablet Take 1 tablet by mouth daily    Historical Provider, MD   cyclobenzaprine (FLEXERIL) 10 MG tablet Take 10 mg by mouth as needed for Muscle spasms Splits the pill in half. Only takes it when needed. Historical Provider, MD   aspirin-acetaminophen-caffeine (Luther Velazquez) 058-885-19 MG per tablet Take 1 tablet by mouth as needed for Headaches    Historical Provider, MD   acetaminophen (TYLENOL) 500 MG tablet Take 1,000 mg by mouth. Historical Provider, MD   cetirizine (ZYRTEC) 10 MG tablet Take 10 mg by mouth daily     Historical Provider, MD   montelukast (SINGULAIR) 10 MG tablet Take 10 mg by mouth nightly  10/5/14   Historical Provider, MD       Current medications:    No current facility-administered medications for this encounter. Current Outpatient Medications   Medication Sig Dispense Refill    sertraline (ZOLOFT) 100 MG tablet Take 150 mg by mouth daily      levonorgestrel-ethinyl estradiol (LARISSIA) 0.1-20 MG-MCG per tablet Take 1 tablet by mouth daily      cyclobenzaprine (FLEXERIL) 10 MG tablet Take 10 mg by mouth as needed for Muscle spasms Splits the pill in half. Only takes it when needed.  aspirin-acetaminophen-caffeine (EXCEDRIN MIGRAINE) 250-250-65 MG per tablet Take 1 tablet by mouth as needed for Headaches      acetaminophen (TYLENOL) 500 MG tablet Take 1,000 mg by mouth.       cetirizine (ZYRTEC) 10 MG tablet Take 10 mg by mouth daily       montelukast (SINGULAIR) 10 MG tablet Take 10 mg by mouth nightly          Allergies: Allergies   Allergen Reactions    Amoxicillin Hives       Problem List:    Patient Active Problem List   Diagnosis Code    Pain in joint, lower leg M25.569    Effusion of lower leg joint M25.469    Fracture of tibial spine S82.113A    Pes planus, flexible M21.40    Transient synovitis, left ankle and foot M67.372       Past Medical History:        Diagnosis Date    Asthma     Transient synovitis, left ankle and foot        Past Surgical History:        Procedure Laterality Date    FOOT SURGERY Bilateral     tendon stretched    KNEE SURGERY Left     shattered knee-total of 2 surgeries (ACL and meniscus repair)    KNEE SURGERY Right     removed damaged cartilage    WISDOM TOOTH EXTRACTION         Social History:    Social History     Tobacco Use    Smoking status: Never Smoker    Smokeless tobacco: Never Used   Substance Use Topics    Alcohol use: Yes     Comment: 4-5 drinks per month                                Counseling given: Not Answered      Vital Signs (Current):   Vitals:    12/27/21 1420 12/30/21 1425   Weight: 170 lb (77.1 kg) 188 lb (85.3 kg)   Height: 5' 2\" (1.575 m)                                               BP Readings from Last 3 Encounters:   11/10/21 121/71   03/09/20 128/79   10/14/14 105/73 (45 %, Z = -0.13 /  83 %, Z = 0.95)*     *BP percentiles are based on the 2017 AAP Clinical Practice Guideline for girls       NPO Status:                                                                                 BMI:   Wt Readings from Last 3 Encounters:   11/10/21 182 lb (82.6 kg)   03/09/20 150 lb (68 kg) (80 %, Z= 0.85)*   10/14/14 130 lb (59 kg) (78 %, Z= 0.79)*     * Growth percentiles are based on CDC (Girls, 2-20 Years) data. Body mass index is 34.39 kg/m².     CBC:   Lab Results   Component Value Date    WBC 8.8 09/08/2021 RBC 4.76 09/08/2021    HGB 13.5 09/08/2021    HCT 40.1 09/08/2021    MCV 84.2 09/08/2021    RDW 14.1 09/08/2021     09/08/2021       CMP:   Lab Results   Component Value Date     09/08/2021    K 4.4 09/08/2021     09/08/2021    CO2 23 09/08/2021    BUN 6 09/08/2021    CREATININE 0.6 09/08/2021    GFRAA >60 09/08/2021    LABGLOM >60 09/08/2021    GLUCOSE 88 09/08/2021    CALCIUM 9.6 09/08/2021       POC Tests: No results for input(s): POCGLU, POCNA, POCK, POCCL, POCBUN, POCHEMO, POCHCT in the last 72 hours. Coags: No results found for: PROTIME, INR, APTT    HCG (If Applicable): No results found for: PREGTESTUR, PREGSERUM, HCG, HCGQUANT     ABGs: No results found for: PHART, PO2ART, CCE1VTX, DWP9ICE, BEART, N3BPARLR     Type & Screen (If Applicable):  No results found for: LABABO, LABRH    Drug/Infectious Status (If Applicable):  No results found for: HIV, HEPCAB    COVID-19 Screening (If Applicable):   Lab Results   Component Value Date    COVID19 Not Detected 12/30/2021           Anesthesia Evaluation  Patient summary reviewed and Nursing notes reviewed no history of anesthetic complications:   Airway: Mallampati: II  TM distance: >3 FB   Neck ROM: full  Mouth opening: > = 3 FB Dental: normal exam         Pulmonary:   (+) asthma:                            Cardiovascular:Negative CV ROS                      Neuro/Psych:   Negative Neuro/Psych ROS              GI/Hepatic/Renal: Neg GI/Hepatic/Renal ROS       (-) GERD, liver disease and no renal disease       Endo/Other: Negative Endo/Other ROS       (-) diabetes mellitus               Abdominal:             Vascular: negative vascular ROS. Other Findings:           Anesthesia Plan      general     ASA 2     (I discussed with the patient the risks and benefits of PIV, general anesthesia, IV Narcotics, PACU. All questions were answered the patient agrees with the plan)  Induction: intravenous.     MIPS: Prophylactic antiemetics administered. Anesthetic plan and risks discussed with patient. Plan discussed with CRNA.                   Mary Mohamud MD   1/2/2022

## 2022-01-03 NOTE — PROGRESS NOTES
Patient arrived in PACU at this time and placed on monitor. Report received from [address] and CRNA. Oral airway in place. VSS. Will continue to monitor.

## 2022-01-10 ENCOUNTER — TELEPHONE (OUTPATIENT)
Dept: ENT CLINIC | Age: 22
End: 2022-01-10

## 2022-01-10 DIAGNOSIS — Z90.89 S/P TONSILLECTOMY: ICD-10-CM

## 2022-01-10 DIAGNOSIS — J03.91 RECURRENT ACUTE TONSILLITIS: ICD-10-CM

## 2022-01-10 RX ORDER — HYDROCODONE BITARTRATE AND ACETAMINOPHEN 5; 325 MG/1; MG/1
1 TABLET ORAL EVERY 4 HOURS PRN
Qty: 30 TABLET | Refills: 0 | Status: SHIPPED | OUTPATIENT
Start: 2022-01-10 | End: 2022-01-17

## 2022-01-19 ENCOUNTER — TELEPHONE (OUTPATIENT)
Dept: PULMONOLOGY | Age: 22
End: 2022-01-19

## 2022-01-19 NOTE — TELEPHONE ENCOUNTER
Office cancelled appointment on 2/10/22 with Dr. Teri Montemayor for NPT Sleep. We LM on 1/19/22 and informed pt we would need to cancel appt and requested pt to call office back to reschedule appt.

## 2022-01-25 ENCOUNTER — OFFICE VISIT (OUTPATIENT)
Dept: ENT CLINIC | Age: 22
End: 2022-01-25

## 2022-01-25 VITALS — BODY MASS INDEX: 33.31 KG/M2 | WEIGHT: 188 LBS | TEMPERATURE: 97.7 F | HEIGHT: 63 IN

## 2022-01-25 DIAGNOSIS — Z90.89 S/P TONSILLECTOMY: Primary | ICD-10-CM

## 2022-01-25 PROCEDURE — 99024 POSTOP FOLLOW-UP VISIT: CPT | Performed by: OTOLARYNGOLOGY

## 2022-01-25 RX ORDER — HYDROXYZINE HYDROCHLORIDE 25 MG/1
25 TABLET, FILM COATED ORAL 2 TIMES DAILY
COMMUNITY

## 2022-01-25 NOTE — PROGRESS NOTES
Micheline Holland is a 24year old female 3 weeks status post tonsillectomy. Doing well, return to work 1/17/2022. Has pain with yawning.       Appropriate postoperative changes to bilateral tonsillar fossa      [place] paperwork filled out and faxed  Return as needed

## 2022-04-22 ENCOUNTER — HOSPITAL ENCOUNTER (EMERGENCY)
Age: 22
Discharge: HOME | End: 2022-04-22
Attending: STUDENT IN AN ORGANIZED HEALTH CARE EDUCATION/TRAINING PROGRAM
Payer: COMMERCIAL

## 2022-04-22 VITALS
SYSTOLIC BLOOD PRESSURE: 120 MMHG | OXYGEN SATURATION: 99 % | RESPIRATION RATE: 18 BRPM | HEART RATE: 87 BPM | DIASTOLIC BLOOD PRESSURE: 70 MMHG | TEMPERATURE: 98.7 F | HEIGHT: 62 IN | BODY MASS INDEX: 36.8 KG/M2 | WEIGHT: 200 LBS

## 2022-04-22 DIAGNOSIS — G43.109 MIGRAINE WITH AURA AND WITHOUT STATUS MIGRAINOSUS, NOT INTRACTABLE: Primary | ICD-10-CM

## 2022-04-22 PROCEDURE — 2580000003 HC RX 258: Performed by: STUDENT IN AN ORGANIZED HEALTH CARE EDUCATION/TRAINING PROGRAM

## 2022-04-22 PROCEDURE — 6360000002 HC RX W HCPCS: Performed by: STUDENT IN AN ORGANIZED HEALTH CARE EDUCATION/TRAINING PROGRAM

## 2022-04-22 PROCEDURE — 99284 EMERGENCY DEPT VISIT MOD MDM: CPT

## 2022-04-22 PROCEDURE — 6370000000 HC RX 637 (ALT 250 FOR IP): Performed by: STUDENT IN AN ORGANIZED HEALTH CARE EDUCATION/TRAINING PROGRAM

## 2022-04-22 PROCEDURE — 96374 THER/PROPH/DIAG INJ IV PUSH: CPT

## 2022-04-22 PROCEDURE — 96375 TX/PRO/DX INJ NEW DRUG ADDON: CPT

## 2022-04-22 RX ORDER — ACETAMINOPHEN 325 MG/1
650 TABLET ORAL ONCE
Status: COMPLETED | OUTPATIENT
Start: 2022-04-22 | End: 2022-04-22

## 2022-04-22 RX ORDER — DIPHENHYDRAMINE HYDROCHLORIDE 50 MG/ML
25 INJECTION INTRAMUSCULAR; INTRAVENOUS ONCE
Status: COMPLETED | OUTPATIENT
Start: 2022-04-22 | End: 2022-04-22

## 2022-04-22 RX ORDER — PROCHLORPERAZINE EDISYLATE 5 MG/ML
10 INJECTION INTRAMUSCULAR; INTRAVENOUS ONCE
Status: COMPLETED | OUTPATIENT
Start: 2022-04-22 | End: 2022-04-22

## 2022-04-22 RX ORDER — 0.9 % SODIUM CHLORIDE 0.9 %
1000 INTRAVENOUS SOLUTION INTRAVENOUS ONCE
Status: COMPLETED | OUTPATIENT
Start: 2022-04-22 | End: 2022-04-22

## 2022-04-22 RX ORDER — DEXAMETHASONE SODIUM PHOSPHATE 10 MG/ML
10 INJECTION, SOLUTION INTRAMUSCULAR; INTRAVENOUS ONCE
Status: COMPLETED | OUTPATIENT
Start: 2022-04-22 | End: 2022-04-22

## 2022-04-22 RX ADMIN — SODIUM CHLORIDE 1000 ML: 9 INJECTION, SOLUTION INTRAVENOUS at 09:43

## 2022-04-22 RX ADMIN — PROCHLORPERAZINE EDISYLATE 10 MG: 5 INJECTION INTRAMUSCULAR; INTRAVENOUS at 09:42

## 2022-04-22 RX ADMIN — ACETAMINOPHEN 650 MG: 325 TABLET ORAL at 09:42

## 2022-04-22 RX ADMIN — DEXAMETHASONE SODIUM PHOSPHATE 10 MG: 10 INJECTION, SOLUTION INTRAMUSCULAR; INTRAVENOUS at 09:41

## 2022-04-22 RX ADMIN — DIPHENHYDRAMINE HYDROCHLORIDE 25 MG: 50 INJECTION, SOLUTION INTRAMUSCULAR; INTRAVENOUS at 09:40

## 2022-04-22 ASSESSMENT — ENCOUNTER SYMPTOMS
NAUSEA: 1
PHOTOPHOBIA: 1
STRIDOR: 0
SORE THROAT: 0
COUGH: 0
ABDOMINAL PAIN: 0
RHINORRHEA: 0
BACK PAIN: 0
SHORTNESS OF BREATH: 0
VOMITING: 0

## 2022-04-22 ASSESSMENT — PAIN - FUNCTIONAL ASSESSMENT: PAIN_FUNCTIONAL_ASSESSMENT: 0-10

## 2022-04-22 ASSESSMENT — PAIN DESCRIPTION - ORIENTATION: ORIENTATION: RIGHT

## 2022-04-22 ASSESSMENT — PAIN SCALES - GENERAL
PAINLEVEL_OUTOF10: 2
PAINLEVEL_OUTOF10: 8
PAINLEVEL_OUTOF10: 8

## 2022-04-22 ASSESSMENT — PAIN DESCRIPTION - LOCATION: LOCATION: HEAD

## 2022-04-22 ASSESSMENT — PAIN DESCRIPTION - PAIN TYPE: TYPE: ACUTE PAIN

## 2022-04-22 NOTE — ED PROVIDER NOTES
[address] ED  EMERGENCY DEPARTMENT ENCOUNTER      Pt Name: Piero Tracy  MRN: [id number]  [place] 2000  Date of evaluation: 4/22/2022  Provider: Connor Sharma DO    CHIEF COMPLAINT       Chief Complaint   Patient presents with    Migraine     since tuesday; weds pt started having spots in vision; nausea         HISTORY OF PRESENT ILLNESS   (Location/Symptom, Timing/Onset, Context/Setting, Quality, Duration, Modifying Factors, Severity)  Note limiting factors. Piero Tracy is a 24 y.o. female who presents to the emergency department complaining of migraine headache. Patient with longstanding history of headache symptoms. Follows with neurology. Patient reports that headache began this past Tuesday, was typical for her began with her typical aura. Patient reports that she has had a gradual worsening of right-sided pounding headache. Overall she is well-appearing in room speaking full sentences no acute distress. Denies change in her headache from prior. Denies neck pain or stiffness fevers chills recent URI symptoms cough congestion chest pain abdominal pain nausea vomiting. Denies focal neurologic deficit. Has had some visual change at the start of her headache which is typical for her. Currently feels lightheaded at times as well as nausea with photosensitivity. Nursing Notes were reviewed.     PAST MEDICAL HISTORY     Past Medical History:   Diagnosis Date    Asthma     Transient synovitis, left ankle and foot          SURGICAL HISTORY       Past Surgical History:   Procedure Laterality Date    FOOT SURGERY Bilateral     tendon stretched    KNEE SURGERY Left     shattered knee-total of 2 surgeries (ACL and meniscus repair)    KNEE SURGERY Right     removed damaged cartilage    TONSILLECTOMY AND ADENOIDECTOMY N/A 1/3/2022    TONSILLECTOMY performed by Juan Ross DO at [place] [address] Previous Medications    ACETAMINOPHEN (TYLENOL) 500 MG TABLET    Take 1,000 mg by mouth. ALBUTEROL SULFATE HFA (VENTOLIN HFA) 108 (90 BASE) MCG/ACT INHALER    Inhale 2 puffs into the lungs every 6 hours as needed for Wheezing    ASPIRIN-ACETAMINOPHEN-CAFFEINE (EXCEDRIN MIGRAINE) 250-250-65 MG PER TABLET    Take 1 tablet by mouth as needed for Headaches    CETIRIZINE (ZYRTEC) 10 MG TABLET    Take 10 mg by mouth daily     CYCLOBENZAPRINE (FLEXERIL) 10 MG TABLET    Take 10 mg by mouth as needed for Muscle spasms Splits the pill in half. Only takes it when needed.     HYDROXYZINE (ATARAX) 25 MG TABLET    Take 25 mg by mouth 2 times daily    LEVONORGESTREL-ETHINYL ESTRADIOL (LARISSIA) 0.1-20 MG-MCG PER TABLET    Take 1 tablet by mouth daily    MONTELUKAST (SINGULAIR) 10 MG TABLET    Take 10 mg by mouth nightly     SERTRALINE (ZOLOFT) 100 MG TABLET    Take 100 mg by mouth 2 times daily        ALLERGIES     Amoxicillin and Clavulanic acid    FAMILY HISTORY       Family History   Problem Relation Age of Onset    Cancer Maternal Uncle     Osteoporosis Maternal Grandfather     High Blood Pressure Mother     Thyroid Disease Mother     Thyroid Disease Father           SOCIAL HISTORY       Social History     Socioeconomic History    Marital status: Single     Spouse name: Not on file    Number of children: Not on file    Years of education: Not on file    Highest education level: Not on file   Occupational History    Not on file   Tobacco Use    Smoking status: Never Smoker    Smokeless tobacco: Never Used   Vaping Use    Vaping Use: Never used   Substance and Sexual Activity    Alcohol use: Yes     Comment: 4-5 drinks per month    Drug use: No    Sexual activity: Never   Other Topics Concern    Not on file   Social History Narrative    Not on file     Social Determinants of Health     Financial Resource Strain:     Difficulty of Paying Living Expenses: Not on file   Food Insecurity:     Worried About Running Out of Food in the Last Year: Not on file    Ran Out of Food in the Last Year: Not on file   Transportation Needs:     Lack of Transportation (Medical): Not on file    Lack of Transportation (Non-Medical): Not on file   Physical Activity:     Days of Exercise per Week: Not on file    Minutes of Exercise per Session: Not on file   Stress:     Feeling of Stress : Not on file   Social Connections:     Frequency of Communication with Friends and Family: Not on file    Frequency of Social Gatherings with Friends and Family: Not on file    Attends [employer]ism Services: Not on file    Active Member of [address] [place] [address] [employer] or Organizations: Not on file    Attends Club or Organization Meetings: Not on file    Marital Status: Not on file   Intimate Partner Violence:     Fear of Current or Ex-Partner: Not on file    Emotionally Abused: Not on file    Physically Abused: Not on file    Sexually Abused: Not on file   Housing Stability:     Unable to Pay for Housing in the Last Year: Not on file    Number of [place] in the Last Year: Not on file    Unstable Housing in the Last Year: Not on file       SCREENINGS        [place] Coma Scale  Eye Opening: Spontaneous  Best Verbal Response: Oriented  Best Motor Response: Obeys commands  [place] Coma Scale Score: 15                   REVIEW OF SYSTEMS    (2-9 systems for level 4, 10 or more for level 5)   Review of Systems   Constitutional: Negative for chills and fever. HENT: Negative for congestion, rhinorrhea and sore throat. Eyes: Positive for photophobia. Negative for visual disturbance. Respiratory: Negative for cough, shortness of breath and stridor. Cardiovascular: Negative for chest pain, palpitations and leg swelling. Gastrointestinal: Positive for nausea. Negative for abdominal pain and vomiting. Genitourinary: Negative for decreased urine volume. Musculoskeletal: Negative for back pain, neck pain and neck stiffness. Skin: Negative for rash. Allergic/Immunologic: Negative for environmental allergies. Neurological: Positive for light-headedness and headaches. Negative for dizziness, weakness and numbness. Hematological: Negative for adenopathy. Psychiatric/Behavioral: Negative for confusion. PHYSICAL EXAM    (up to 7 for level 4, 8 or more for level 5)   RECENT VITALS:     Temp: 98.5 °F (36.9 °C),  Pulse: 95, Resp: 16, BP: 136/86, SpO2: 100 %    Physical Exam  Constitutional:       General: She is not in acute distress. Appearance: She is not diaphoretic. HENT:      Head: Normocephalic and atraumatic. Eyes:      Pupils: Pupils are equal, round, and reactive to light. Neck:      Trachea: No tracheal deviation. Cardiovascular:      Rate and Rhythm: Normal rate and regular rhythm. Pulmonary:      Effort: Pulmonary effort is normal. No respiratory distress. Abdominal:      General: There is no distension. Palpations: Abdomen is soft. Musculoskeletal:         General: Normal range of motion. Cervical back: Normal range of motion and neck supple. Skin:     General: Skin is warm. Neurological:      Mental Status: She is oriented to person, place, and time. Sensory: No sensory deficit. Motor: No weakness. DIAGNOSTIC RESULTS     EKG: All EKG's are interpreted by the Emergency Department Physician who either signs or Co-signs this chart in the absence of a cardiologist.      RADIOLOGY:   Non-plain film images such as CT, Ultrasound and MRI are read by the radiologist. Plain radiographic images are visualized and preliminarily interpreted by the emergency physician. Interpretation per the Radiologist below, if available at the time of this note:    No orders to display         LABS:  Labs Reviewed - No data to display    All other labs were within normal range or not returned as of this dictation. EMERGENCY DEPARTMENT COURSE and DIFFERENTIAL DIAGNOSIS/MDM:   Logan Ast is a 24 y.o. female who presents to the emergency department with the complaint of migraine headache, typical headache for her. Longstanding history of also neurology. Vitals are stable she extremely well-appearing. She has nonfocal nonconcerning neurologic exam.  Neck is supple. Low suspicion for meningitis encephalitis, subarachnoid hemorrhage. Not thunderclap in presentation. Onset was typical for her gradual worsening. Not relieved with typical over-the-counter treatments. Will treat with fluids, Decadron, Benadryl, Compazine, Tylenol. We will plan to reassess following interventions. At this point I do not feel labs or imaging are warranted. Patient currently on menstrual cycle. No abdominal pain. Patient was reassessed prior to disposition. Patient reports that she was feeling extremely anxious and wanted to be discharged home. I discussed with her that she may be having reaction to Compazine that we can continue treatments here including additional Benadryl fluids and additional medications as needed. States that her headache is completely resolved and she like to be discharged home. She was made aware that she may return any point for reevaluation patient has Atarax at home for anxiety treatments. Does have a history anxiety states this feels similar. CRITICAL CARE TIME   Total Critical Care time was 0 minutes, excluding separately reportable procedures. There was a high probability of clinically significant/life threatening deterioration in the patient's condition which required my urgent intervention. Clinical concern   Intervention     CONSULTS:  None    PROCEDURES:  Unless otherwise noted below, none     Procedures        FINAL IMPRESSION      1.  Migraine with aura and without status migrainosus, not intractable          DISPOSITION/PLAN   DISPOSITION  dc      PATIENT REFERRED TO:  [place] [facility]  [address] [facility]  [phone]    If symptoms brice    Diego Navarrete, [address] [place] [address] [address] [place] [address]  [phone]    Schedule an appointment as soon as possible for a visit in 2 days        DISCHARGE MEDICATIONS:  New Prescriptions    No medications on file     Controlled Substances Monitoring:     No flowsheet data found.     (Please note that portions of this note were completed with a voice recognition program.  Efforts were made to edit the dictations but occasionally words are mis-transcribed.)    Leslie Orellana DO (electronically signed)  Attending Emergency Physician            Leslie Orellana DO  04/22/22 1042

## 2022-04-22 NOTE — Clinical Note
Donaldo Kidney was seen and treated in our emergency department on 4/22/2022. She may return to work on 04/25/2022. If you have any questions or concerns, please don't hesitate to call.       Dilip Mendoza, DO

## 2022-05-12 ENCOUNTER — TELEPHONE (OUTPATIENT)
Dept: PULMONOLOGY | Age: 22
End: 2022-05-12

## 2022-05-12 NOTE — TELEPHONE ENCOUNTER
Pt apologized for missing appt. She had in her mind it was at 9:30 not 8:30. She rescheduled for 8/17/22.

## 2022-05-12 NOTE — TELEPHONE ENCOUNTER
Patient did not show for NPT visit  with Dr. Tino Harp on 5/12/22. Reason:  na    This is patient's first no show. Patient was ano show on: na.      Patient did not reschedule.   Reschedule date:  na

## 2022-09-08 ENCOUNTER — HOSPITAL ENCOUNTER (OUTPATIENT)
Dept: CT IMAGING | Age: 22
Discharge: HOME | End: 2022-09-08
Payer: COMMERCIAL

## 2022-09-08 DIAGNOSIS — R06.02 SHORTNESS OF BREATH: ICD-10-CM

## 2022-09-08 PROCEDURE — 71250 CT THORAX DX C-: CPT

## 2022-11-03 NOTE — TELEPHONE ENCOUNTER
Lisa called in to report patient is having a lot of pain and wants to know if an appointment needs to be scheduled for patient to be seen in office  or if Dr. Chuck Bradley can prescribe more pain medication.      Patient had surgery 1/3/22 TONSILLECTOMY AND ADENOIDECTOMY     Patient call back [phone]  Patient pharmacy [facility] [phone] [Negative] : Integumentary [FreeTextEntry1] : pain

## 2023-01-31 ENCOUNTER — HOSPITAL ENCOUNTER (EMERGENCY)
Age: 23
Discharge: HOME | End: 2023-01-31
Attending: STUDENT IN AN ORGANIZED HEALTH CARE EDUCATION/TRAINING PROGRAM
Payer: COMMERCIAL

## 2023-01-31 VITALS
BODY MASS INDEX: 36.8 KG/M2 | DIASTOLIC BLOOD PRESSURE: 60 MMHG | TEMPERATURE: 98.2 F | OXYGEN SATURATION: 96 % | WEIGHT: 200 LBS | SYSTOLIC BLOOD PRESSURE: 105 MMHG | HEART RATE: 102 BPM | RESPIRATION RATE: 16 BRPM | HEIGHT: 62 IN

## 2023-01-31 DIAGNOSIS — R51.9 ACUTE NONINTRACTABLE HEADACHE, UNSPECIFIED HEADACHE TYPE: Primary | ICD-10-CM

## 2023-01-31 LAB — HCG(URINE) PREGNANCY TEST: NEGATIVE

## 2023-01-31 PROCEDURE — 2580000003 HC RX 258: Performed by: STUDENT IN AN ORGANIZED HEALTH CARE EDUCATION/TRAINING PROGRAM

## 2023-01-31 PROCEDURE — 96374 THER/PROPH/DIAG INJ IV PUSH: CPT

## 2023-01-31 PROCEDURE — 84703 CHORIONIC GONADOTROPIN ASSAY: CPT

## 2023-01-31 PROCEDURE — 99284 EMERGENCY DEPT VISIT MOD MDM: CPT

## 2023-01-31 PROCEDURE — 96375 TX/PRO/DX INJ NEW DRUG ADDON: CPT

## 2023-01-31 PROCEDURE — 6370000000 HC RX 637 (ALT 250 FOR IP): Performed by: STUDENT IN AN ORGANIZED HEALTH CARE EDUCATION/TRAINING PROGRAM

## 2023-01-31 PROCEDURE — 6360000002 HC RX W HCPCS: Performed by: STUDENT IN AN ORGANIZED HEALTH CARE EDUCATION/TRAINING PROGRAM

## 2023-01-31 RX ORDER — ACETAMINOPHEN 500 MG
1000 TABLET ORAL
Status: COMPLETED | OUTPATIENT
Start: 2023-01-31 | End: 2023-01-31

## 2023-01-31 RX ORDER — METOCLOPRAMIDE HYDROCHLORIDE 5 MG/ML
10 INJECTION INTRAMUSCULAR; INTRAVENOUS ONCE
Status: COMPLETED | OUTPATIENT
Start: 2023-01-31 | End: 2023-01-31

## 2023-01-31 RX ORDER — DIPHENHYDRAMINE HYDROCHLORIDE 50 MG/ML
50 INJECTION INTRAMUSCULAR; INTRAVENOUS ONCE
Status: COMPLETED | OUTPATIENT
Start: 2023-01-31 | End: 2023-01-31

## 2023-01-31 RX ORDER — 0.9 % SODIUM CHLORIDE 0.9 %
1000 INTRAVENOUS SOLUTION INTRAVENOUS ONCE
Status: COMPLETED | OUTPATIENT
Start: 2023-01-31 | End: 2023-01-31

## 2023-01-31 RX ORDER — KETOROLAC TROMETHAMINE 30 MG/ML
15 INJECTION, SOLUTION INTRAMUSCULAR; INTRAVENOUS ONCE
Status: COMPLETED | OUTPATIENT
Start: 2023-01-31 | End: 2023-01-31

## 2023-01-31 RX ADMIN — DIPHENHYDRAMINE HYDROCHLORIDE 50 MG: 50 INJECTION, SOLUTION INTRAMUSCULAR; INTRAVENOUS at 03:20

## 2023-01-31 RX ADMIN — SODIUM CHLORIDE 1000 ML: 9 INJECTION, SOLUTION INTRAVENOUS at 03:18

## 2023-01-31 RX ADMIN — METOCLOPRAMIDE 10 MG: 5 INJECTION, SOLUTION INTRAMUSCULAR; INTRAVENOUS at 03:19

## 2023-01-31 RX ADMIN — ACETAMINOPHEN 1000 MG: 500 TABLET ORAL at 03:19

## 2023-01-31 RX ADMIN — KETOROLAC TROMETHAMINE 15 MG: 30 INJECTION, SOLUTION INTRAMUSCULAR; INTRAVENOUS at 03:19

## 2023-01-31 ASSESSMENT — PAIN SCALES - GENERAL
PAINLEVEL_OUTOF10: 5
PAINLEVEL_OUTOF10: 8
PAINLEVEL_OUTOF10: 8
PAINLEVEL_OUTOF10: 3

## 2023-01-31 ASSESSMENT — PAIN DESCRIPTION - DESCRIPTORS: DESCRIPTORS: ACHING

## 2023-01-31 ASSESSMENT — PAIN - FUNCTIONAL ASSESSMENT
PAIN_FUNCTIONAL_ASSESSMENT: 0-10
PAIN_FUNCTIONAL_ASSESSMENT: 0-10

## 2023-01-31 ASSESSMENT — PAIN DESCRIPTION - LOCATION
LOCATION: HEAD

## 2023-01-31 ASSESSMENT — PAIN DESCRIPTION - ONSET: ONSET: ON-GOING

## 2023-01-31 ASSESSMENT — PAIN DESCRIPTION - FREQUENCY: FREQUENCY: CONTINUOUS

## 2023-01-31 NOTE — ED PROVIDER NOTES
[address] ED      CHIEF COMPLAINT  Headache (Headache with nausea since 10pm. Hx migraines. No medicatins taken at home. )       HISTORY OF PRESENT ILLNESS  Randi Carlson is a 25 y.o. female  who presents to the ED complaining of right-sided headache. Patient states the headache started 5 hours ago, became worse with nausea and vomiting 2 hours ago. This does feel similar to her previous migraines, however more intense. Denies any fevers or chills. No rash. No other complaints, modifying factors or associated symptoms. I have reviewed the following from the nursing documentation.     Past Medical History:   Diagnosis Date    Asthma     Migraine     Transient synovitis, left ankle and foot      Past Surgical History:   Procedure Laterality Date    FOOT SURGERY Bilateral     tendon stretched    KNEE SURGERY Left     shattered knee-total of 2 surgeries (ACL and meniscus repair)    KNEE SURGERY Right     removed damaged cartilage    TONSILLECTOMY AND ADENOIDECTOMY N/A 1/3/2022    TONSILLECTOMY performed by Ashly Staples DO at [address] [place] [address]       Family History   Problem Relation Age of Onset    Cancer Maternal Uncle     Osteoporosis Maternal Grandfather     High Blood Pressure Mother     Thyroid Disease Mother     Thyroid Disease Father      Social History     Socioeconomic History    Marital status: Single     Spouse name: Not on file    Number of children: Not on file    Years of education: Not on file    Highest education level: Not on file   Occupational History    Not on file   Tobacco Use    Smoking status: Never    Smokeless tobacco: Never   Vaping Use    Vaping Use: Never used   Substance and Sexual Activity    Alcohol use: Yes     Comment: 4-5 drinks per month    Drug use: No    Sexual activity: Never   Other Topics Concern    Not on file   Social History Narrative    Not on file     Social Determinants of Health     Financial Resource Strain: Not on file Food Insecurity: Not on file   Transportation Needs: Not on file   Physical Activity: Not on file   Stress: Not on file   Social Connections: Not on file   Intimate Partner Violence: Not on file   Housing Stability: Not on file     Current Facility-Administered Medications   Medication Dose Route Frequency Provider Last Rate Last Admin    0.9 % sodium chloride bolus  1,000 mL IntraVENous Once Malia Senate, DO        ketorolac (TORADOL) injection 15 mg  15 mg IntraVENous Once Malia Senate, DO        metoclopramide (REGLAN) injection 10 mg  10 mg IntraVENous Once Malia Senate, DO        diphenhydrAMINE (BENADRYL) injection 50 mg  50 mg IntraVENous Once Malia Senate, DO        acetaminophen (TYLENOL) tablet 1,000 mg  1,000 mg Oral NOW Malia Senate, DO         Current Outpatient Medications   Medication Sig Dispense Refill    hydrOXYzine (ATARAX) 25 MG tablet Take 25 mg by mouth 2 times daily      albuterol sulfate HFA (VENTOLIN HFA) 108 (90 Base) MCG/ACT inhaler Inhale 2 puffs into the lungs every 6 hours as needed for Wheezing      sertraline (ZOLOFT) 100 MG tablet Take 100 mg by mouth 2 times daily       levonorgestrel-ethinyl estradiol (LARISSIA) 0.1-20 MG-MCG per tablet Take 1 tablet by mouth daily      cyclobenzaprine (FLEXERIL) 10 MG tablet Take 10 mg by mouth as needed for Muscle spasms Splits the pill in half. Only takes it when needed. aspirin-acetaminophen-caffeine (EXCEDRIN MIGRAINE) 250-250-65 MG per tablet Take 1 tablet by mouth as needed for Headaches      acetaminophen (TYLENOL) 500 MG tablet Take 1,000 mg by mouth. cetirizine (ZYRTEC) 10 MG tablet Take 10 mg by mouth daily       montelukast (SINGULAIR) 10 MG tablet Take 10 mg by mouth nightly        Allergies   Allergen Reactions    Amoxicillin Hives    Clavulanic Acid      Other reaction(s): Altered Heart Rate       REVIEW OF SYSTEMS  10 systems reviewed, pertinent positives per HPI otherwise noted to be negative.     PHYSICAL EXAM  BP 119/81   Pulse (!) 117   Temp 98.2 °F (36.8 °C) (Oral)   Resp 18   Ht 5' 2\" (1.575 m)   Wt 200 lb (90.7 kg)   LMP 01/26/2023 (Exact Date)   SpO2 96%   BMI 36.58 kg/m²    General: Appears well. Alert  HEENT: Head atraumatic, Eyes normal inspection, PERRL. Normal ENT inspection, Pharynx normal. No signs of dehydration  NECK: Normal inspection, no meningismus  RESPIRATORY: Normal breath sounds. No chest wall tenderness. No respiratory distress  CVS: Heart rate and rhythm regular. No Murmurs  ABDOMEN/GI: Soft, Non-tender, No distention  BACK: Normal inspection  EXTREMITIES: Non-Tender. Full ROM. Normal appearance. No Pedal edema  NEURO: Alert and oriented. Sensation normal. Motor normal  PSYCH: Mood normal. Affect normal.  SKIN: Color normal. No rash. Warm, Dry    ED COURSE/MDM  Patient seen and evaluated. Old records reviewed. Labs and imaging reviewed and results discussed with patient. DIFFERENTIAL DX  Migraine, tension headache, cluster headache    Patient describes her headache as similar to her previous migraines, more intense. Urine pregnancy is negative. Treated with IV fluid, ketorolac, Reglan, Benadryl, Tylenol. On reevaluation she is feeling very anxious but headache is improving. Wait approximate other hour and her anxiety is slightly improved and her headache is nearly gone. She is discharged home with follow-up to PCP. Given return precautions for severe worsening pain, nausea, vomiting, fevers, other concerning symptoms. Is this patient to be included in the SEP-1 Core Measure due to severe sepsis or septic shock? No   Exclusion criteria - the patient is NOT to be included for SEP-1 Core Measure due to:   Infection is not suspected    During the patient's ED course, the patient was given:  Medications   0.9 % sodium chloride bolus (has no administration in time range)   ketorolac (TORADOL) injection 15 mg (has no administration in time range)   metoclopramide (REGLAN) injection 10 mg (has no administration in time range)   diphenhydrAMINE (BENADRYL) injection 50 mg (has no administration in time range)   acetaminophen (TYLENOL) tablet 1,000 mg (has no administration in time range)        I, Fletcher Mancia DO,  am the primary clinician of record. CLINICAL IMPRESSION  1. Acute nonintractable headache, unspecified headache type        Blood pressure 104/67, pulse (!) 103, temperature 98.2 °F (36.8 °C), temperature source Oral, resp. rate 16, height 5' 2\" (1.575 m), weight 200 lb (90.7 kg), last menstrual period 01/26/2023, SpO2 98 %. DISPOSITION  Randi Iyer was discharged to home in stable condition. Patient was given scripts for the following medications. I counseled patient how to take these medications. New Prescriptions    No medications on file       Follow-up with:  Rito Lopez DO    Schedule an appointment as soon as possible for a visit in 3 days  Follow up within 3 days, Return to ED sooner if symptoms worsen    DISCLAIMER: This chart was created using Dragon dictation software. Efforts were made by me to ensure accuracy, however some errors may be present due to limitations of this technology and occasionally words are not transcribed correctly.         Fletcher Mancia DO  01/31/23 6698

## 2023-01-31 NOTE — DISCHARGE INSTRUCTIONS
Return the nearest ED if you develop severe worsening headache, nausea and vomiting, fevers, other concerning symptoms.

## 2023-01-31 NOTE — ED NOTES
Pt informed that she cannot drive home after medicated and pt verbalized understanding and stated would call for ride.      Kerline Liu RN  01/31/23 4004

## 2023-01-31 NOTE — Clinical Note
Karyn Agrawal was seen and treated in our emergency department on 1/31/2023. She may return to work on 02/01/2023. If you have any questions or concerns, please don't hesitate to call.       Roman Moise, DO

## 2025-02-28 ENCOUNTER — TRANSCRIBE ORDERS (OUTPATIENT)
Dept: ADMINISTRATIVE | Age: 25
End: 2025-02-28

## 2025-02-28 DIAGNOSIS — R10.31 RIGHT LOWER QUADRANT PAIN: Primary | ICD-10-CM

## 2025-03-04 ENCOUNTER — HOSPITAL ENCOUNTER (OUTPATIENT)
Dept: CT IMAGING | Age: 25
Discharge: HOME | End: 2025-03-04
Payer: COMMERCIAL

## 2025-03-04 DIAGNOSIS — R10.31 RIGHT LOWER QUADRANT PAIN: ICD-10-CM

## 2025-03-04 PROCEDURE — 74177 CT ABD & PELVIS W/CONTRAST: CPT

## 2025-03-04 PROCEDURE — 6360000004 HC RX CONTRAST MEDICATION: Performed by: FAMILY MEDICINE

## 2025-03-04 RX ORDER — IOPAMIDOL 755 MG/ML
75 INJECTION, SOLUTION INTRAVASCULAR
Status: COMPLETED | OUTPATIENT
Start: 2025-03-04 | End: 2025-03-04

## 2025-03-04 RX ADMIN — IOPAMIDOL 75 ML: 755 INJECTION, SOLUTION INTRAVENOUS at 08:13

## (undated) DEVICE — PENCIL ES L3M BTTN SWCH S STL HEX LOK BLDE ELECTRD HOLSTER

## (undated) DEVICE — MINOR SET UP: Brand: MEDLINE INDUSTRIES, INC.

## (undated) DEVICE — SUTURE PERMA-HAND SZ 2-0 L30IN NONABSORBABLE BLK L26MM SH K833H

## (undated) DEVICE — GLOVE SURG SZ 6 THK91MIL LTX FREE SYN POLYISOPRENE ANTI

## (undated) DEVICE — TUBING, SUCTION, 3/16" X 12', STRAIGHT: Brand: MEDLINE

## (undated) DEVICE — SPONGE,TONSIL,DBL STRNG,XRAY,MED,1",STRL: Brand: MEDLINE INDUSTRIES, INC.

## (undated) DEVICE — KIT,ANTI FOG,W/SPONGE & FLUID,SOFT PACK: Brand: MEDLINE

## (undated) DEVICE — GLOVE,SURG,SENSICARE,ALOE,LF,PF,7: Brand: MEDLINE

## (undated) DEVICE — COAGULATOR SUCT 10FR L6IN HND FT SWCH VALLEYLAB

## (undated) DEVICE — PENCIL SMK EVAC ALL IN 1 DSGN ENH VISIBILITY IMPROVED AIR

## (undated) DEVICE — BLADE ES ELASTOMERIC COAT INSUL DURABLE BEND UPTO 90DEG